# Patient Record
Sex: FEMALE | URBAN - METROPOLITAN AREA
[De-identification: names, ages, dates, MRNs, and addresses within clinical notes are randomized per-mention and may not be internally consistent; named-entity substitution may affect disease eponyms.]

---

## 2020-07-27 ENCOUNTER — APPOINTMENT (RX ONLY)
Dept: URBAN - METROPOLITAN AREA CLINIC 139 | Facility: CLINIC | Age: 60
Setting detail: DERMATOLOGY
End: 2020-07-27

## 2020-07-27 DIAGNOSIS — L73.9 FOLLICULAR DISORDER, UNSPECIFIED: ICD-10-CM

## 2020-07-27 DIAGNOSIS — L663 OTHER SPECIFIED DISEASES OF HAIR AND HAIR FOLLICLES: ICD-10-CM

## 2020-07-27 DIAGNOSIS — L738 OTHER SPECIFIED DISEASES OF HAIR AND HAIR FOLLICLES: ICD-10-CM

## 2020-07-27 DIAGNOSIS — L28.1 PRURIGO NODULARIS: ICD-10-CM

## 2020-07-27 PROBLEM — L02.12 FURUNCLE OF NECK: Status: ACTIVE | Noted: 2020-07-27

## 2020-07-27 PROCEDURE — ? COUNSELING

## 2020-07-27 PROCEDURE — 99202 OFFICE O/P NEW SF 15 MIN: CPT

## 2020-07-27 PROCEDURE — ? ORDER TESTS

## 2020-07-27 PROCEDURE — ? PRESCRIPTION

## 2020-07-27 RX ORDER — FLURANDRENOLIDE 4 UG/CM2
TAPE TOPICAL
Qty: 2 | Refills: 3 | Status: ERX | COMMUNITY
Start: 2020-07-27

## 2020-07-27 RX ADMIN — FLURANDRENOLIDE: 4 TAPE TOPICAL at 00:00

## 2020-07-27 ASSESSMENT — SEVERITY ASSESSMENT: SEVERITY: MILD

## 2020-07-27 ASSESSMENT — LOCATION ZONE DERM
LOCATION ZONE: ARM
LOCATION ZONE: NECK

## 2020-07-27 ASSESSMENT — LOCATION SIMPLE DESCRIPTION DERM
LOCATION SIMPLE: RIGHT POSTERIOR UPPER ARM
LOCATION SIMPLE: LEFT POSTERIOR UPPER ARM
LOCATION SIMPLE: POSTERIOR NECK

## 2020-07-27 ASSESSMENT — LOCATION DETAILED DESCRIPTION DERM
LOCATION DETAILED: RIGHT PROXIMAL POSTERIOR UPPER ARM
LOCATION DETAILED: LEFT MEDIAL TRAPEZIAL NECK
LOCATION DETAILED: LEFT PROXIMAL POSTERIOR UPPER ARM
LOCATION DETAILED: LEFT INFERIOR POSTERIOR NECK

## 2020-07-30 RX ORDER — FLURANDRENOLIDE 4 UG/CM2
TAPE TOPICAL
Qty: 2 | Refills: 3 | Status: ERX

## 2020-08-04 ENCOUNTER — RX ONLY (OUTPATIENT)
Age: 60
Setting detail: RX ONLY
End: 2020-08-04

## 2020-08-04 RX ORDER — MINOCYCLINE HYDROCHLORIDE 100 MG/1
CAPSULE ORAL
Qty: 28 | Refills: 0 | Status: ERX | COMMUNITY
Start: 2020-08-04

## 2020-08-21 ENCOUNTER — RX ONLY (OUTPATIENT)
Age: 60
Setting detail: RX ONLY
End: 2020-08-21

## 2020-08-21 ENCOUNTER — APPOINTMENT (RX ONLY)
Dept: URBAN - METROPOLITAN AREA CLINIC 139 | Facility: CLINIC | Age: 60
Setting detail: DERMATOLOGY
End: 2020-08-21

## 2020-08-21 DIAGNOSIS — L28.1 PRURIGO NODULARIS: ICD-10-CM

## 2020-08-21 PROCEDURE — ? OTHER

## 2020-08-21 PROCEDURE — ? COUNSELING

## 2020-08-21 RX ORDER — HALOBETASOL PROPIONATE 0.5 MG/G
CREAM TOPICAL
Qty: 1 | Refills: 2 | Status: ERX | COMMUNITY
Start: 2020-08-21

## 2020-08-21 ASSESSMENT — LOCATION ZONE DERM: LOCATION ZONE: ARM

## 2020-08-21 ASSESSMENT — LOCATION SIMPLE DESCRIPTION DERM
LOCATION SIMPLE: LEFT POSTERIOR UPPER ARM
LOCATION SIMPLE: RIGHT POSTERIOR UPPER ARM

## 2020-08-21 ASSESSMENT — LOCATION DETAILED DESCRIPTION DERM
LOCATION DETAILED: RIGHT PROXIMAL POSTERIOR UPPER ARM
LOCATION DETAILED: LEFT PROXIMAL POSTERIOR UPPER ARM

## 2020-08-21 NOTE — PROCEDURE: OTHER
Note Text (......Xxx Chief Complaint.): This diagnosis correlates with the
Other (Free Text): Per Dr Shelton RX for halobetasol can be sent over to patient’s pharmacy.
Detail Level: Zone

## 2020-10-02 ENCOUNTER — RX ONLY (OUTPATIENT)
Age: 60
Setting detail: RX ONLY
End: 2020-10-02

## 2020-10-02 RX ORDER — MINOCYCLINE HYDROCHLORIDE 100 MG/1
CAPSULE ORAL
Qty: 28 | Refills: 0 | Status: ERX | COMMUNITY
Start: 2020-10-02

## 2020-11-24 ENCOUNTER — APPOINTMENT (RX ONLY)
Dept: URBAN - METROPOLITAN AREA CLINIC 41 | Facility: CLINIC | Age: 60
Setting detail: DERMATOLOGY
End: 2020-11-24

## 2020-11-24 DIAGNOSIS — L28.1 PRURIGO NODULARIS: ICD-10-CM

## 2020-11-24 DIAGNOSIS — L72.0 EPIDERMAL CYST: ICD-10-CM

## 2020-11-24 PROCEDURE — ? COUNSELING

## 2020-11-24 PROCEDURE — ? PRESCRIPTION

## 2020-11-24 PROCEDURE — ? INTRALESIONAL KENALOG

## 2020-11-24 PROCEDURE — 11901 INJECT SKIN LESIONS >7: CPT

## 2020-11-24 PROCEDURE — ? TREATMENT REGIMEN

## 2020-11-24 PROCEDURE — 99213 OFFICE O/P EST LOW 20 MIN: CPT | Mod: 25

## 2020-11-24 RX ORDER — FLUOCINONIDE 0.5 MG/ML
SOLUTION TOPICAL
Qty: 1 | Refills: 3 | Status: ERX | COMMUNITY
Start: 2020-11-24

## 2020-11-24 RX ORDER — HALOBETASOL PROPIONATE 0.5 MG/G
CREAM TOPICAL
Qty: 1 | Refills: 2 | Status: ERX

## 2020-11-24 RX ADMIN — FLUOCINONIDE: 0.5 SOLUTION TOPICAL at 00:00

## 2020-11-24 ASSESSMENT — LOCATION SIMPLE DESCRIPTION DERM
LOCATION SIMPLE: LEFT POSTERIOR UPPER ARM
LOCATION SIMPLE: LEFT SHOULDER
LOCATION SIMPLE: HAIR
LOCATION SIMPLE: RIGHT SHOULDER
LOCATION SIMPLE: LEFT ELBOW
LOCATION SIMPLE: POSTERIOR NECK
LOCATION SIMPLE: RIGHT ELBOW
LOCATION SIMPLE: RIGHT POSTERIOR UPPER ARM

## 2020-11-24 ASSESSMENT — LOCATION DETAILED DESCRIPTION DERM
LOCATION DETAILED: LEFT POSTERIOR SHOULDER
LOCATION DETAILED: LEFT DISTAL LATERAL POSTERIOR UPPER ARM
LOCATION DETAILED: RIGHT POSTERIOR SHOULDER
LOCATION DETAILED: RIGHT POSTERIOR NECK
LOCATION DETAILED: LEFT ELBOW
LOCATION DETAILED: MID POSTERIOR NECK
LOCATION DETAILED: LEFT PROXIMAL POSTERIOR UPPER ARM
LOCATION DETAILED: RIGHT LATERAL ELBOW
LOCATION DETAILED: RIGHT DISTAL LATERAL POSTERIOR UPPER ARM
LOCATION DETAILED: RIGHT PROXIMAL POSTERIOR UPPER ARM
LOCATION DETAILED: LEFT DISTAL POSTERIOR UPPER ARM
LOCATION DETAILED: RIGHT PROXIMAL LATERAL POSTERIOR UPPER ARM
LOCATION DETAILED: LEFT LATERAL ELBOW
LOCATION DETAILED: RIGHT DISTAL POSTERIOR UPPER ARM
LOCATION DETAILED: HAIR

## 2020-11-24 ASSESSMENT — LOCATION ZONE DERM
LOCATION ZONE: SCALP
LOCATION ZONE: ARM
LOCATION ZONE: NECK

## 2020-11-24 NOTE — PROCEDURE: TREATMENT REGIMEN
Plan: Explained in great detail what this diagnosis means. Explained she has to keep her hands off of her arms and scalp or this will never improve
Detail Level: Zone
Initiate Treatment: Lidex solution BID scalp
Continue Regimen: Halbetasol BID PRN

## 2020-11-24 NOTE — PROCEDURE: INTRALESIONAL KENALOG
Detail Level: Detailed
Medical Necessity Clause: This procedure was medically necessary because the lesions that were treated were:
Treatment Number (Optional): 1
Administered By (Optional): sherry Ellis MD
Total Volume Injected (Ccs- Only Use Numbers And Decimals): 1.8
Consent: The risks of atrophy were reviewed with the patient.
X Size Of Lesion In Cm (Optional): 0
Kenalog Preparation: Kenalog
Include Z78.9 (Other Specified Conditions Influencing Health Status) As An Associated Diagnosis?: No
Concentration Of Solution Injected (Mg/Ml): 3.0

## 2022-07-15 ENCOUNTER — APPOINTMENT (RX ONLY)
Dept: URBAN - METROPOLITAN AREA CLINIC 41 | Facility: CLINIC | Age: 62
Setting detail: DERMATOLOGY
End: 2022-07-15

## 2022-07-15 DIAGNOSIS — H02.6 XANTHELASMA OF EYELID: ICD-10-CM

## 2022-07-15 DIAGNOSIS — L81.4 OTHER MELANIN HYPERPIGMENTATION: ICD-10-CM

## 2022-07-15 PROBLEM — H02.64 XANTHELASMA OF LEFT UPPER EYELID: Status: ACTIVE | Noted: 2022-07-15

## 2022-07-15 PROBLEM — H02.62 XANTHELASMA OF RIGHT LOWER EYELID: Status: ACTIVE | Noted: 2022-07-15

## 2022-07-15 PROBLEM — H02.61 XANTHELASMA OF RIGHT UPPER EYELID: Status: ACTIVE | Noted: 2022-07-15

## 2022-07-15 PROCEDURE — ? COUNSELING

## 2022-07-15 PROCEDURE — 99213 OFFICE O/P EST LOW 20 MIN: CPT

## 2022-07-15 ASSESSMENT — LOCATION SIMPLE DESCRIPTION DERM
LOCATION SIMPLE: SUPERIOR FOREHEAD
LOCATION SIMPLE: LEFT SUPERIOR EYELID
LOCATION SIMPLE: RIGHT SUPERIOR EYELID
LOCATION SIMPLE: RIGHT INFERIOR EYELID

## 2022-07-15 ASSESSMENT — LOCATION DETAILED DESCRIPTION DERM
LOCATION DETAILED: LEFT MEDIAL SUPERIOR EYELID
LOCATION DETAILED: RIGHT MEDIAL INFERIOR EYELID
LOCATION DETAILED: SUPERIOR MID FOREHEAD
LOCATION DETAILED: RIGHT MEDIAL SUPERIOR EYELID

## 2022-07-15 ASSESSMENT — LOCATION ZONE DERM
LOCATION ZONE: FACE
LOCATION ZONE: EYELID

## 2022-07-15 NOTE — PROCEDURE: REASSURANCE
Detail Level: Simple
Hide Additional Notes?: No
Additional Notes (Optional): Advised patient to have lipids monitored by PCP. May consider LN2/cautery test site for lower lid lesion for cosmetic destruction if desired but reviewed possible scarring and recurrence.

## 2022-07-15 NOTE — PROCEDURE: MIPS QUALITY
Quality 226: Preventive Care And Screening: Tobacco Use: Screening And Cessation Intervention: Patient screened for tobacco use, is a smoker AND received Cessation Counseling within the Previous 12 Months
Quality 130: Documentation Of Current Medications In The Medical Record: Current Medications Documented
Detail Level: Detailed
Quality 431: Preventive Care And Screening: Unhealthy Alcohol Use - Screening: Patient not identified as an unhealthy alcohol user when screened for unhealthy alcohol use using a systematic screening method